# Patient Record
Sex: FEMALE | Race: ASIAN | Employment: FULL TIME | URBAN - METROPOLITAN AREA
[De-identification: names, ages, dates, MRNs, and addresses within clinical notes are randomized per-mention and may not be internally consistent; named-entity substitution may affect disease eponyms.]

---

## 2017-01-18 ENCOUNTER — OFFICE VISIT (OUTPATIENT)
Dept: INTERNAL MEDICINE CLINIC | Facility: HOSPITAL | Age: 27
End: 2017-01-18
Attending: EMERGENCY MEDICINE

## 2017-01-18 DIAGNOSIS — Z00.00 WELLNESS EXAMINATION: Primary | ICD-10-CM

## 2017-01-18 PROCEDURE — 86787 VARICELLA-ZOSTER ANTIBODY: CPT

## 2017-01-20 LAB — VZV IGG SER IA-ACNC: 824.3 (ref 165–?)

## 2017-06-13 PROBLEM — F98.8 ADD (ATTENTION DEFICIT DISORDER): Status: ACTIVE | Noted: 2017-06-13

## 2017-06-13 PROBLEM — L30.9 ECZEMA: Status: ACTIVE | Noted: 2017-06-13

## 2017-06-13 NOTE — PROGRESS NOTES
CC:  Patient presents with:  New Patient: establish care   Behavioral Problem: ADD  Abnormal Result (metabolic, cardiac): Elevated fasting glucose on healthcare screening testing       HPI: 33year old new patient here with concerns for worsening ADD over (attention deficit disorder) 1996         Social History   Marital Status:   Spouse Name: N/A    Years of Education: N/A  Number of Children: N/A     Occupational History  None on file     Social History Main Topics   Smoking status: Never Smoker with lowest dose of Adderall XR 10 mg daily, and instructed to take before work in the morning and never in the evening to prevent hyperstimulation before bedtime  - Plan to check urine drug screen at next visit per clinic policy  - Reviewed possible side

## 2017-06-26 PROCEDURE — 80307 DRUG TEST PRSMV CHEM ANLYZR: CPT | Performed by: FAMILY MEDICINE

## 2017-06-26 NOTE — PROGRESS NOTES
CC:  Patient presents with:  Medication Follow-Up: adderall-feeling better  Cough: dry cough x4 days      HPI: 33year old female here to follow-up on Adderall for ADD and with a dry cough x 4 days.    has been sick with a cough and sore throat x 2 w Occupational History  None on file     Social History Main Topics   Smoking status: Never Smoker    Smokeless tobacco: Not on file    Alcohol use No    Drug use: Unknown     Other Topics Concern   None on file     Social History Narrative   None on pablo date patient would run out, but instructed not to fill until needed   - Return for follow-up prior to next refill or sooner as needed   - DRUG ABUSE PANEL 10 SCREEN; Future    2.  Viral URI with cough    - Supportive care discussed  - Return if no improveme

## 2017-08-11 ENCOUNTER — PATIENT MESSAGE (OUTPATIENT)
Dept: FAMILY MEDICINE CLINIC | Facility: CLINIC | Age: 27
End: 2017-08-11

## 2017-08-11 NOTE — TELEPHONE ENCOUNTER
From: Adin Bhatti  To: Andrae Davenport DO  Sent: 8/11/2017 10:06 AM CDT  Subject: Non-Urgent Terri Enriquez,     I had a question regarding my eczema--specifically, what I could be using/doing to manage it more efficiently.  I know we've dis

## 2017-08-18 ENCOUNTER — PATIENT MESSAGE (OUTPATIENT)
Dept: FAMILY MEDICINE CLINIC | Facility: CLINIC | Age: 27
End: 2017-08-18

## 2017-08-18 RX ORDER — DEXTROAMPHETAMINE SACCHARATE, AMPHETAMINE ASPARTATE MONOHYDRATE, DEXTROAMPHETAMINE SULFATE AND AMPHETAMINE SULFATE 2.5; 2.5; 2.5; 2.5 MG/1; MG/1; MG/1; MG/1
10 CAPSULE, EXTENDED RELEASE ORAL EVERY MORNING
Qty: 30 CAPSULE | Refills: 0 | Status: SHIPPED | OUTPATIENT
Start: 2017-08-18 | End: 2017-08-18

## 2017-08-18 RX ORDER — DEXTROAMPHETAMINE SACCHARATE, AMPHETAMINE ASPARTATE MONOHYDRATE, DEXTROAMPHETAMINE SULFATE AND AMPHETAMINE SULFATE 2.5; 2.5; 2.5; 2.5 MG/1; MG/1; MG/1; MG/1
10 CAPSULE, EXTENDED RELEASE ORAL EVERY MORNING
Qty: 30 CAPSULE | Refills: 0 | Status: SHIPPED | OUTPATIENT
Start: 2017-09-18 | End: 2017-10-18

## 2017-08-18 NOTE — TELEPHONE ENCOUNTER
From: Kathrine Carrel  To: Daniela Virgen DO  Sent: 8/18/2017 10:03 AM CDT  Subject: Prescription Question    Hello,     I received a written prescription from Dr. Liu Waldrop on 7/13/17 for Adderall, and am finally approaching the last couple pills from that prescri

## 2021-05-21 NOTE — PROGRESS NOTES
For severe nighttime cough symptoms, will also trial Promethazine with codeine at bedtime to help with coughing and restful sleep. Not to take during the day given sedative/drowsy side effect and only to take for a maximum of 5 days. 1

## (undated) NOTE — MR AVS SNAPSHOT
[address] at [address], [place] [address]  [phone]               Thank you for choosing us for your health care visit with Courtney Farias DO.   We are glad to serve you and happy to provide you with th [place], [facility] [address]     Phone:  [phone]    - triamcinolone acetonide 0.1 % Crea      You can get these medications from any pharmacy     Bring a paper prescription for each of these medications    - Amphetamine-Dextroamphet ER 10 MG Cp24